# Patient Record
Sex: FEMALE | Race: WHITE | NOT HISPANIC OR LATINO | URBAN - METROPOLITAN AREA
[De-identification: names, ages, dates, MRNs, and addresses within clinical notes are randomized per-mention and may not be internally consistent; named-entity substitution may affect disease eponyms.]

---

## 2024-04-29 ENCOUNTER — E-VISIT (OUTPATIENT)
Dept: PRIMARY CARE | Facility: CLINIC | Age: 49
End: 2024-04-29
Payer: COMMERCIAL

## 2024-04-29 ENCOUNTER — LAB (OUTPATIENT)
Dept: LAB | Facility: LAB | Age: 49
End: 2024-04-29
Payer: COMMERCIAL

## 2024-04-29 DIAGNOSIS — R39.9 UTI SYMPTOMS: Primary | ICD-10-CM

## 2024-04-29 DIAGNOSIS — R39.9 UTI SYMPTOMS: ICD-10-CM

## 2024-04-29 LAB
APPEARANCE UR: ABNORMAL
BILIRUB UR STRIP.AUTO-MCNC: NEGATIVE MG/DL
COLOR UR: ABNORMAL
GLUCOSE UR STRIP.AUTO-MCNC: NORMAL MG/DL
KETONES UR STRIP.AUTO-MCNC: NEGATIVE MG/DL
LEUKOCYTE ESTERASE UR QL STRIP.AUTO: ABNORMAL
NITRITE UR QL STRIP.AUTO: NEGATIVE
PH UR STRIP.AUTO: 7 [PH]
PROT UR STRIP.AUTO-MCNC: NEGATIVE MG/DL
RBC # UR STRIP.AUTO: NEGATIVE /UL
RBC #/AREA URNS AUTO: NORMAL /HPF
SP GR UR STRIP.AUTO: 1.01
UROBILINOGEN UR STRIP.AUTO-MCNC: NORMAL MG/DL
WBC #/AREA URNS AUTO: NORMAL /HPF

## 2024-04-29 PROCEDURE — 87086 URINE CULTURE/COLONY COUNT: CPT

## 2024-04-29 PROCEDURE — 81001 URINALYSIS AUTO W/SCOPE: CPT

## 2024-04-30 LAB — BACTERIA UR CULT: NORMAL

## 2024-04-30 NOTE — TELEPHONE ENCOUNTER
Complaint: Bladder pressure  Sx reviewed as stated in questionnaire  Sx onset: 1 day  Has been drinking plenty of fluids.     Plan: Due to lack of typical UTI symptoms, will place order for UA and culture. Will follow up with results and treat appropriately. Patient instructed to monitor for signs of fever, chills, flank pain.     Differential dx: UTI, interstitial cystitis, atrophic vaginitis       Total time spent 11 minutes.

## 2024-04-30 NOTE — TELEPHONE ENCOUNTER
Spoke with patient regarding results. UA showed +lueks, -nitrites, RBC. Microscopic WNL. Patient denies dysuria, fever, chills, nausea, vomiting, flank pain. Has not had any feeling of bladder spasms or pressure this morning. Will wait for results of urine culture and treat if positive for bacterial growth. Recommend follow up with PCP if culture does not show any growth. Patient understands and agrees with plan.

## 2025-01-07 ENCOUNTER — OFFICE VISIT (OUTPATIENT)
Dept: OBSTETRICS AND GYNECOLOGY | Facility: CLINIC | Age: 50
End: 2025-01-07
Payer: COMMERCIAL

## 2025-01-07 VITALS
BODY MASS INDEX: 20.73 KG/M2 | SYSTOLIC BLOOD PRESSURE: 132 MMHG | HEIGHT: 69 IN | WEIGHT: 140 LBS | DIASTOLIC BLOOD PRESSURE: 70 MMHG

## 2025-01-07 DIAGNOSIS — Z12.31 SCREENING MAMMOGRAM FOR BREAST CANCER: ICD-10-CM

## 2025-01-07 DIAGNOSIS — Z01.411 ENCNTR FOR GYN EXAM (GENERAL) (ROUTINE) W ABNORMAL FINDINGS: Primary | ICD-10-CM

## 2025-01-07 DIAGNOSIS — Z12.4 PAP SMEAR FOR CERVICAL CANCER SCREENING: ICD-10-CM

## 2025-01-07 DIAGNOSIS — N81.4 UTERINE PROLAPSE: ICD-10-CM

## 2025-01-07 DIAGNOSIS — Z11.51 SCREENING FOR HUMAN PAPILLOMAVIRUS (HPV): ICD-10-CM

## 2025-01-07 PROCEDURE — 99203 OFFICE O/P NEW LOW 30 MIN: CPT | Performed by: NURSE PRACTITIONER

## 2025-01-07 PROCEDURE — 87624 HPV HI-RISK TYP POOLED RSLT: CPT

## 2025-01-07 PROCEDURE — 3008F BODY MASS INDEX DOCD: CPT | Performed by: NURSE PRACTITIONER

## 2025-01-07 PROCEDURE — 99396 PREV VISIT EST AGE 40-64: CPT | Performed by: NURSE PRACTITIONER

## 2025-01-07 PROCEDURE — 1036F TOBACCO NON-USER: CPT | Performed by: NURSE PRACTITIONER

## 2025-01-07 ASSESSMENT — ENCOUNTER SYMPTOMS
GASTROINTESTINAL NEGATIVE: 1
ENDOCRINE NEGATIVE: 1
RESPIRATORY NEGATIVE: 1
CARDIOVASCULAR NEGATIVE: 1
CONSTITUTIONAL NEGATIVE: 1
EYES NEGATIVE: 1
MUSCULOSKELETAL NEGATIVE: 1
NEUROLOGICAL NEGATIVE: 1
PSYCHIATRIC NEGATIVE: 1

## 2025-01-07 NOTE — PROGRESS NOTES
Subjective   Patient ID: Aby Flores is a 49 y.o. female who presents for New Patient Visit (Normal PAP / HPV at a different practice 5/7/2019/Last Mammogram in chart 9/19/2019).  49 year old here for annual exam with notice of a red spot on her cervix. She denies any pain or discharge.  She notes she was previously told she had a prolapse after her last son 9 years ago.  Her last pap was normal with neg hpv in 2019.  She denies any history of abnormal paps and mammograms.         Review of Systems   Constitutional: Negative.    HENT: Negative.     Eyes: Negative.    Respiratory: Negative.     Cardiovascular: Negative.    Gastrointestinal: Negative.    Endocrine: Negative.    Genitourinary: Negative.    Musculoskeletal: Negative.    Skin: Negative.    Neurological: Negative.    Psychiatric/Behavioral: Negative.         Objective   Physical Exam  Vitals reviewed.   Constitutional:       Appearance: Normal appearance. She is well-developed.   Pulmonary:      Effort: Pulmonary effort is normal. No respiratory distress.   Chest:   Breasts:     Breasts are symmetrical.      Right: Normal. No swelling, bleeding, inverted nipple, mass, nipple discharge, skin change or tenderness.      Left: Normal. No swelling, bleeding, inverted nipple, mass, nipple discharge, skin change or tenderness.   Abdominal:      Palpations: Abdomen is soft.   Genitourinary:     General: Normal vulva.      Exam position: Lithotomy position.      Pubic Area: No rash.       Labia:         Right: No rash, tenderness, lesion or injury.         Left: No rash, tenderness, lesion or injury.       Urethra: No prolapse, urethral pain, urethral swelling or urethral lesion.      Vagina: Normal.      Cervix: Lesion present.      Uterus: Normal. With uterine prolapse.       Adnexa: Right adnexa normal and left adnexa normal.      Rectum: Normal.         Musculoskeletal:         General: Normal range of motion.   Lymphadenopathy:      Upper Body:      Right  upper body: No supraclavicular, axillary or pectoral adenopathy.      Left upper body: No supraclavicular, axillary or pectoral adenopathy.   Skin:     General: Skin is warm and dry.   Neurological:      General: No focal deficit present.      Mental Status: She is alert and oriented to person, place, and time. Mental status is at baseline.   Psychiatric:         Attention and Perception: Attention and perception normal.         Mood and Affect: Mood and affect normal.         Speech: Speech normal.         Behavior: Behavior normal. Behavior is cooperative.         Thought Content: Thought content normal.         Judgment: Judgment normal.         Assessment/Plan   Problem List Items Addressed This Visit    None  Visit Diagnoses         Codes    Encntr for gyn exam (general) (routine) w abnormal findings    -  Primary Z01.411    Pap smear for cervical cancer screening     Z12.4    Relevant Orders    THINPREP PAP TEST    Screening for human papillomavirus (HPV)     Z11.51    Relevant Orders    THINPREP PAP TEST    Screening mammogram for breast cancer     Z12.31    Relevant Orders    BI mammo bilateral screening tomosynthesis        ANNUAL:  Pap/hpv sent  Mammogram ordered  PROLAPSE:  Reviewed pelvic floor physical therapy to help strengthen pelvic floor  Reviewed to see Dr. Sherman if desires, at this time symptoms not bothering her and desires to continue to monitor  Follow up 1 year, sooner as needed if problems arise         LORA Pennington-CNP 01/07/25 11:17 AM

## 2025-01-14 LAB
CYTOLOGY CMNT CVX/VAG CYTO-IMP: NORMAL
HPV HR 12 DNA GENITAL QL NAA+PROBE: NEGATIVE
HPV HR GENOTYPES PNL CVX NAA+PROBE: NEGATIVE
HPV16 DNA SPEC QL NAA+PROBE: NEGATIVE
HPV18 DNA SPEC QL NAA+PROBE: NEGATIVE
LAB AP HPV GENOTYPE QUESTION: YES
LAB AP HPV HR: NORMAL
LABORATORY COMMENT REPORT: NORMAL
LABORATORY COMMENT REPORT: NORMAL
LMP START DATE: NORMAL
PATH REPORT.TOTAL CANCER: NORMAL

## 2025-03-10 ENCOUNTER — APPOINTMENT (OUTPATIENT)
Dept: OBSTETRICS AND GYNECOLOGY | Facility: CLINIC | Age: 50
End: 2025-03-10
Payer: COMMERCIAL